# Patient Record
Sex: FEMALE | URBAN - METROPOLITAN AREA
[De-identification: names, ages, dates, MRNs, and addresses within clinical notes are randomized per-mention and may not be internally consistent; named-entity substitution may affect disease eponyms.]

---

## 2020-08-25 ENCOUNTER — APPOINTMENT (OUTPATIENT)
Age: 50
Setting detail: DERMATOLOGY
End: 2020-08-28

## 2020-08-25 VITALS — TEMPERATURE: 98 F

## 2020-08-25 DIAGNOSIS — H02.83 DERMATOCHALASIS OF EYELID: ICD-10-CM

## 2020-08-25 DIAGNOSIS — L98.8 OTHER SPECIFIED DISORDERS OF THE SKIN AND SUBCUTANEOUS TISSUE: ICD-10-CM

## 2020-08-25 PROBLEM — H02.831 DERMATOCHALASIS OF RIGHT UPPER EYELID: Status: ACTIVE | Noted: 2020-08-25

## 2020-08-25 PROBLEM — H02.839 DERMATOCHALASIS OF UNSPECIFIED EYE, UNSPECIFIED EYELID: Status: ACTIVE | Noted: 2020-08-25

## 2020-08-25 PROBLEM — H02.834 DERMATOCHALASIS OF LEFT UPPER EYELID: Status: ACTIVE | Noted: 2020-08-25

## 2020-08-25 PROBLEM — H02.835 DERMATOCHALASIS OF LEFT LOWER EYELID: Status: ACTIVE | Noted: 2020-08-25

## 2020-08-25 PROCEDURE — OTHER COUNSELING: OTHER

## 2020-08-25 PROCEDURE — OTHER MIPS QUALITY: OTHER

## 2020-08-25 PROCEDURE — OTHER RECOMMENDATIONS: OTHER

## 2020-08-25 PROCEDURE — OTHER OBSERVATION: OTHER

## 2020-08-25 PROCEDURE — OTHER COSMETIC CONSULTATION: FILLERS: OTHER

## 2020-08-25 PROCEDURE — OTHER COSMETIC CONSULTATION: BLEPHAROPLASTY: OTHER

## 2020-08-25 ASSESSMENT — LOCATION DETAILED DESCRIPTION DERM
LOCATION DETAILED: RIGHT SUPERIOR MEDIAL MALAR CHEEK
LOCATION DETAILED: LEFT SUPERIOR ANTERIOR NECK
LOCATION DETAILED: LEFT MEDIAL INFERIOR EYELID
LOCATION DETAILED: LEFT LATERAL SUPERIOR EYELID
LOCATION DETAILED: RIGHT LATERAL SUPERIOR EYELID
LOCATION DETAILED: RIGHT SUPERIOR ANTERIOR NECK

## 2020-08-25 ASSESSMENT — LOCATION ZONE DERM
LOCATION ZONE: FACE
LOCATION ZONE: NECK
LOCATION ZONE: EYELID

## 2020-08-25 ASSESSMENT — LOCATION SIMPLE DESCRIPTION DERM
LOCATION SIMPLE: LEFT ANTERIOR NECK
LOCATION SIMPLE: LEFT SUPERIOR EYELID
LOCATION SIMPLE: RIGHT SUPERIOR EYELID
LOCATION SIMPLE: RIGHT ANTERIOR NECK
LOCATION SIMPLE: LEFT INFERIOR EYELID
LOCATION SIMPLE: RIGHT CHEEK

## 2020-08-25 NOTE — HPI: DISCOLORATION
How Severe Is Your Skin Discoloration?: moderate
Additional History: Patient does not like appearance

## 2020-08-25 NOTE — HPI: OTHER
Condition:: Excess upper eyelid bilateral eyelids
Please Describe Your Condition:: Patient is bothered by appearance and has noticed it is worsening over recent years. She is concerned about hollowing superiorly. Patient did have sculptra fillers injected into temples in March of 2020 which improved hollowness and volume depletion from aging.

## 2020-08-25 NOTE — PROCEDURE: RECOMMENDATIONS
Recommendations (Free Text): Skin Ceuticals Neck Peptide Cream
Detail Level: Zone
Recommendation Preamble: The following recommendations were made during the visit:
Recommendations (Free Text): Versa

## 2020-10-13 ENCOUNTER — RX ONLY (RX ONLY)
Age: 50
End: 2020-10-13

## 2020-10-13 RX ORDER — ERYTHROMYCIN 5 MG/G
OINTMENT OPHTHALMIC
Qty: 1 | Refills: 1 | Status: ERX | COMMUNITY
Start: 2020-10-13

## 2020-10-15 ENCOUNTER — RX ONLY (RX ONLY)
Age: 50
End: 2020-10-15

## 2020-10-15 RX ORDER — ALPRAZOLAM 0.5 MG/1
TABLET ORAL
Qty: 2 | Refills: 0 | Status: CANCELLED
Stop reason: CLARIF

## 2020-10-16 ENCOUNTER — APPOINTMENT (OUTPATIENT)
Age: 50
Setting detail: DERMATOLOGY
End: 2020-10-16

## 2020-10-16 VITALS — TEMPERATURE: 98.7 F

## 2020-10-16 DIAGNOSIS — R23.8 OTHER SKIN CHANGES: ICD-10-CM

## 2020-10-16 DIAGNOSIS — H02.83 DERMATOCHALASIS OF EYELID: ICD-10-CM

## 2020-10-16 PROBLEM — H02.831 DERMATOCHALASIS OF RIGHT UPPER EYELID: Status: ACTIVE | Noted: 2020-10-16

## 2020-10-16 PROCEDURE — OTHER MIPS QUALITY: OTHER

## 2020-10-16 PROCEDURE — OTHER BLEPHAROPLASTY: OTHER

## 2020-10-16 PROCEDURE — OTHER FILLERS: OTHER

## 2020-10-16 ASSESSMENT — LOCATION SIMPLE DESCRIPTION DERM
LOCATION SIMPLE: RIGHT INFERIOR EYELID
LOCATION SIMPLE: RIGHT SUPERIOR EYELID
LOCATION SIMPLE: LEFT INFERIOR EYELID

## 2020-10-16 ASSESSMENT — LOCATION ZONE DERM: LOCATION ZONE: EYELID

## 2020-10-16 ASSESSMENT — LOCATION DETAILED DESCRIPTION DERM
LOCATION DETAILED: RIGHT MEDIAL INFERIOR EYELID
LOCATION DETAILED: RIGHT LATERAL SUPERIOR EYELID
LOCATION DETAILED: LEFT MEDIAL INFERIOR EYELID

## 2020-10-16 NOTE — PROCEDURE: FILLERS
Additional Area 5 Volume In Cc: 0
Include Cannula Information In Note?: No
Anesthesia Type: 1% lidocaine with epinephrine
Consent: Written consent obtained. Risks include but not limited to bruising, beading, irregular texture, ulceration, infection, allergic reaction, scar formation, incomplete augmentation, temporary nature, procedural pain.
Additional Anesthesia Volume In Cc: 6
Anesthesia Volume In Cc: 0.5
Tear Troughs Filler Volume In Cc: 1
Lot #: [id number]
Detail Level: Detailed
Filler: Versa
Price (Use Numbers Only, No Special Characters Or $): 538
Map Statment: See Attach Map for Complete Details
Expiration Date (Month Year): 01/28/2022
Post-Care Instructions: Patient instructed to apply ice to reduce swelling.

## 2020-10-16 NOTE — PROCEDURE: BLEPHAROPLASTY
Skin Closure Sutures: 6-0 fast asborbing gut
Lower Eyelid Blepharoplasty I: A 4-0 silk traction suture was placed through the lower lid margins and traction was applied superiorly. A 15 blade was used to make an infraciliary incision across the length of the eyelid extending 1 cm beyond the latereal commissure. A skin muscle flap was dissected using the Bovie in the cutting mode down to the level of the interior orbital rim. The nasal, central, and temporal fat compartments were exposed and these were also trimmed back to the level of the inferior orbital rim with a conservative fat resection removal to debulk the herniation of the fat compartment. Minimal resection of the nasal fat pad performed with the predominance inolving the temporal fat pad. Meticulous hemostasis was maintained.
Wound Care Applied To Incision Site: Erythromycin ointment
Consent: The risks, benefits and alternatives of blepharoplasty were discussed with the patient. The patient read and signed the consent form, was identified, and was seated in the exam chair. In the preoperative area with the patient positioned upright on the stretcher, a marker pen was used to delineate the natural lid creases in the affected lids, and the amount of redundant skin and muscle to be removed utilizing a pinch technique. An intravenous line was established and cardiac monitors were placed.
Post-Care Instructions: An ice compress was applied over the eyes. At the conclusion of the procedure, the patient left the operating room in good condition.   The patient was advised to call immediately for any pain, lid swelling or tenderness, discharge, or loss of vision. The patient will return in several days for a postoperative exam.
Upper Eyelid Blepharoplasty: Procedure: Upper Blepharoplasty (cosmetic)\\nPatient name: Rochelle Morillo\\nPre-procedure diagnosis: 1. BUL dermatochalasis (cosmetic)\\nPost-procedure diagnosis: Same\\nProcedure: Upper blepharoplasty bilateral lids\\nSurgeon: Briseno\\nAssistant: DiVincent\\nAnesthesia: local\\nEBL: Minimal\\nComplications: none\\nSpecimen(s): none\\n\\nProcedure in detail: After informed consent was obtained, the patient was brought to the operating room. The eyelid creases were marked with a marking pen, starting nasally from the level of the upper punctum and extended temporally just past the lateral canthus. The upper extent of the dermatochalasis was also marked. Next, lidocaine 2% with epinephrine was injected into bilateral upper eyelids. A grounding electrode was placed along the patient’s arm. The patient was then prepped and draped in standard sterile fashion. \\n\\nUsing the monopolar cautery, the marked lines were incised along the left upper eyelid. The flap of skin was then lifted with 0.5 forceps and carefully excised with the cautery. Hemostasis was achieved with electrocautery. A small strip of central orbicularis muscle was then excised with cautery. The identical procedure was performed on both upper lids. \\n\\nSkin edges were closed with 6-0 prolene suture in running and interrupted fashion. No complications. Topical antibiotic ointment and ice packs were placed over the wounds. The patient tolerated the procedure well and was discharged home in stable condition.\\n
Orbicularis Muscle Closure Sutures: 7-0 vicryl
Intro: The patient was prepped with 10% povidone iodine solution on the skin, and a few drops of 5% povidone iodine solution were placed in the interior fornix. A drape was placed over the eyes in the usual sterile fashion.
Temporary Frost: The previously placed traction sutures was now secured in place to the forehead with Steri-Strips and Mastisol solution as a temporary Frost suture.
Lower Eyelid Blepharoplasty Ii: An overlappping technique was then utilized to dermine the amount of redundant skin and muscle to be excised from lower eyelids. This determination revealed a 3mm amount of vertical skin to be excised which was performed across the length of the skin flap.
Skin Closure: simple interrupted
Detail Level: Generalized
Skin Closure Sutures: 6-0 Prolene
Canthotomy: A lateral canthotomy and cantholyis of the inferior chiqui was performed. A lateral canthal tendon was created in the usual standard manner and secured to the lateral orbital rim periosteum with 2 interrupted 5-0 Prolene sutures. The lateral commissure was reestablished joining the upper and lower eyelids at the gray line with interrupted 7-0 vicryl suture with the knot buried in the soft tissues.
Estimated Blood Loss (Cc): minimal

## 2020-10-22 ENCOUNTER — APPOINTMENT (OUTPATIENT)
Age: 50
Setting detail: DERMATOLOGY
End: 2020-10-27

## 2020-10-22 VITALS — TEMPERATURE: 98.7 F

## 2020-10-22 DIAGNOSIS — Z41.9 ENCOUNTER FOR PROCEDURE FOR PURPOSES OTHER THAN REMEDYING HEALTH STATE, UNSPECIFIED: ICD-10-CM

## 2020-10-22 DIAGNOSIS — Z48.817 ENCOUNTER FOR SURGICAL AFTERCARE FOLLOWING SURGERY ON THE SKIN AND SUBCUTANEOUS TISSUE: ICD-10-CM

## 2020-10-22 PROCEDURE — OTHER MIPS QUALITY: OTHER

## 2020-10-22 PROCEDURE — OTHER COUNSELING: OTHER

## 2020-10-22 PROCEDURE — OTHER RECOMMENDATIONS: OTHER

## 2020-10-22 PROCEDURE — OTHER REASSURANCE: OTHER

## 2020-10-22 PROCEDURE — 99024 POSTOP FOLLOW-UP VISIT: CPT

## 2020-10-22 ASSESSMENT — LOCATION ZONE DERM
LOCATION ZONE: NECK
LOCATION ZONE: FACE

## 2020-10-22 ASSESSMENT — LOCATION DETAILED DESCRIPTION DERM
LOCATION DETAILED: LEFT SUPERIOR ANTERIOR NECK
LOCATION DETAILED: LEFT LATERAL SUBMANDIBULAR CHEEK

## 2020-10-22 ASSESSMENT — LOCATION SIMPLE DESCRIPTION DERM
LOCATION SIMPLE: LEFT CHEEK
LOCATION SIMPLE: LEFT ANTERIOR NECK

## 2020-10-22 NOTE — PROCEDURE: RECOMMENDATIONS
Recommendation Preamble: The following recommendations were made during the visit:
Recommendations (Free Text): TriPeptide neck cream at night
Detail Level: Zone

## 2020-10-22 NOTE — PROCEDURE: REASSURANCE
Hide Additional Notes?: No
Detail Level: Detailed
Additional Notes (Optional): S/p 6 days upper bleph and TT filler\\n- reassured patient she is healing well and no keloids present\\n- trimmed a couple of longer suture tails medially to assist in patient’s comfort while healing \\nRTC 5 days for S/R

## 2020-10-27 ENCOUNTER — RX ONLY (RX ONLY)
Age: 50
End: 2020-10-27

## 2020-10-27 ENCOUNTER — APPOINTMENT (OUTPATIENT)
Age: 50
Setting detail: DERMATOLOGY
End: 2020-10-27

## 2020-10-27 VITALS — TEMPERATURE: 97.6 F

## 2020-10-27 DIAGNOSIS — Z48.02 ENCOUNTER FOR REMOVAL OF SUTURES: ICD-10-CM

## 2020-10-27 PROCEDURE — OTHER REASSURANCE: OTHER

## 2020-10-27 PROCEDURE — OTHER MIPS QUALITY: OTHER

## 2020-10-27 PROCEDURE — 99024 POSTOP FOLLOW-UP VISIT: CPT

## 2020-10-27 PROCEDURE — OTHER SUTURE REMOVAL (GLOBAL PERIOD): OTHER

## 2020-10-27 RX ORDER — ERYTHROMYCIN 5 MG/G
OINTMENT OPHTHALMIC
Qty: 1 | Refills: 1 | Status: ERX

## 2020-10-27 ASSESSMENT — LOCATION DETAILED DESCRIPTION DERM
LOCATION DETAILED: LEFT LATERAL SUPERIOR EYELID
LOCATION DETAILED: LEFT MEDIAL SUPERIOR EYELID
LOCATION DETAILED: RIGHT MEDIAL SUPERIOR EYELID

## 2020-10-27 ASSESSMENT — LOCATION SIMPLE DESCRIPTION DERM
LOCATION SIMPLE: LEFT SUPERIOR EYELID
LOCATION SIMPLE: RIGHT SUPERIOR EYELID

## 2020-10-27 ASSESSMENT — LOCATION ZONE DERM: LOCATION ZONE: EYELID

## 2020-10-27 NOTE — PROCEDURE: SUTURE REMOVAL (GLOBAL PERIOD)
Detail Level: Detailed
Add 18616 Cpt? (Important Note: In 2017 The Use Of 42886 Is Being Tracked By Cms To Determine Future Global Period Reimbursement For Global Periods): no
Add 11333 Cpt? (Important Note: In 2017 The Use Of 53613 Is Being Tracked By Cms To Determine Future Global Period Reimbursement For Global Periods): yes

## 2020-10-27 NOTE — PROCEDURE: REASSURANCE
Additional Notes (Optional): s/p 9 days upper bleph healing well \\n-minimal erythema present at suture line\\n-pt tolerated s/r well and RTC 3 weeks for post op visit\\n-continue ointment on wounds
Detail Level: Detailed
Hide Additional Notes?: No

## 2020-11-17 ENCOUNTER — APPOINTMENT (OUTPATIENT)
Age: 50
Setting detail: DERMATOLOGY
End: 2020-11-18

## 2020-11-17 VITALS — TEMPERATURE: 97.9 F

## 2020-11-17 DIAGNOSIS — Z48.817 ENCOUNTER FOR SURGICAL AFTERCARE FOLLOWING SURGERY ON THE SKIN AND SUBCUTANEOUS TISSUE: ICD-10-CM

## 2020-11-17 PROCEDURE — 99024 POSTOP FOLLOW-UP VISIT: CPT

## 2020-11-17 PROCEDURE — OTHER INTRALESIONAL KENALOG: OTHER

## 2020-11-17 PROCEDURE — OTHER REASSURANCE: OTHER

## 2020-11-17 PROCEDURE — OTHER MIPS QUALITY: OTHER

## 2020-11-17 ASSESSMENT — LOCATION DETAILED DESCRIPTION DERM
LOCATION DETAILED: RIGHT LATERAL CANTHUS
LOCATION DETAILED: LEFT LATERAL CANTHUS
LOCATION DETAILED: LEFT MEDIAL SUPERIOR EYELID

## 2020-11-17 ASSESSMENT — LOCATION SIMPLE DESCRIPTION DERM
LOCATION SIMPLE: LEFT SUPERIOR EYELID
LOCATION SIMPLE: LEFT EYELID
LOCATION SIMPLE: RIGHT EYELID

## 2020-11-17 ASSESSMENT — LOCATION ZONE DERM: LOCATION ZONE: EYELID

## 2020-11-17 NOTE — PROCEDURE: REASSURANCE
Additional Notes (Optional): POM1 s/p cosmetic BULB and tear trough filler on 10/16/20\\n-doing great, patient very happy with results thus far. \\n-wounds well healed and improved upper eyelid height. Improved BLL contours. \\n-patient has been applying ointment and using oral arnica. \\n-recommend vitamin E oil and allow for wound contracture.\\n-provided sente eye cream to be applied nightly as part of cosmetic package \\n-RTC PRN
Detail Level: Detailed
Hide Additional Notes?: No